# Patient Record
Sex: MALE | Race: WHITE | NOT HISPANIC OR LATINO | URBAN - METROPOLITAN AREA
[De-identification: names, ages, dates, MRNs, and addresses within clinical notes are randomized per-mention and may not be internally consistent; named-entity substitution may affect disease eponyms.]

---

## 2017-05-31 ENCOUNTER — OFFICE VISIT - HEALTHEAST (OUTPATIENT)
Dept: FAMILY MEDICINE | Facility: CLINIC | Age: 57
End: 2017-05-31

## 2017-05-31 DIAGNOSIS — G43.909 MIGRAINE HEADACHE: ICD-10-CM

## 2017-05-31 DIAGNOSIS — R79.81 LOW OXYGEN SATURATION: ICD-10-CM

## 2017-05-31 RX ORDER — MULTIPLE VITAMINS W/ MINERALS TAB 9MG-400MCG
1 TAB ORAL DAILY
Status: SHIPPED | COMMUNITY
Start: 2017-05-31

## 2017-05-31 RX ORDER — ATENOLOL 50 MG/1
50 TABLET ORAL DAILY
Status: SHIPPED | COMMUNITY
Start: 2017-05-31

## 2021-05-31 VITALS — WEIGHT: 252.7 LBS

## 2021-06-25 NOTE — PROGRESS NOTES
Progress Notes by Best Bettencourt DO at 5/31/2017  1:00 PM     Author: Best Bettencourt DO Service: -- Author Type: Physician    Filed: 6/1/2017  9:26 AM Encounter Date: 5/31/2017 Status: Signed    : Best Bettencourt DO (Physician)       Chief Complaint   Patient presents with   ? Eye Problem     Visual disturbance. Left eye had blind areas in it, 11:15am today. Light headed.    ? Headache     Just started.      History of Present Illness: Nursing notes reviewed. Patient has a dull headache behind her eyes. Patient had part of visual field disappear from vision out of left eye lasting about 10 minutes total, during which time he felt light headed. He felt dizzy during this visual disturbance, feeling better after sitting in his car for several minutes. His headache discomfort started while waiting in the clinic. No shortness of breath, heart palpitations, or chest pain. No prior history of lung disease. He had similar headache and visual changes once about 20 years ago, which he did not go be seen for. He is not surprised with his visual acuity being a little poor, with him stating he is overdue for an eye exam. Patient states he smoked about 1 PPD for about 15 years total, having quit about 20 years ago.    Review of systems: See history of present illness, otherwise negative.     Current Outpatient Prescriptions   Medication Sig Dispense Refill   ? ASCORBATE CALCIUM (VITAMIN C ORAL) Take by mouth.     ? aspirin 81 MG EC tablet Take 81 mg by mouth daily.     ? atenolol (TENORMIN) 50 MG tablet Take 50 mg by mouth daily.     ? multivitamin-minerals-lutein (MULTIVITAMIN 50 PLUS) tablet Take 1 tablet by mouth daily.       No current facility-administered medications for this visit.        No past medical history on file.   No past surgical history on file.   Social History     Social History   ? Marital status:      Spouse name: N/A   ? Number of children: N/A   ? Years of education: N/A     Social History  Main Topics   ? Smoking status: Former Smoker   ? Smokeless tobacco: Never Used   ? Alcohol use None   ? Drug use: None   ? Sexual activity: Not Asked     Other Topics Concern   ? None     Social History Narrative   ? None       History   Smoking Status   ? Former Smoker   Smokeless Tobacco   ? Never Used      Exam:   Blood pressure 126/76, pulse 66, temperature 98.2  F (36.8  C), temperature source Oral, resp. rate 12, weight (!) 252 lb 11.2 oz (114.6 kg), SpO2 95 %.    EXAM:   General: Vital signs reviewed. Patient is in no acute appearing distress with a pleasant, normal affect. Breathing is non labored appearing. Patient is alert and oriented x 3.   ENT: Tympanic membranes are clear and without injection bilaterally, nasal turbinates show no injection or rhinorrhea, no pharyngeal injection or exudate.  Eyes: PERRL with normal consensual gaze, eye movement, and convergence with accomodation. No scleral eye discoloration. Corneas are clear. Pupils are normal size. No abnormality noted on bilateral fundus exam.  Heart:  Heart rate is normal, regular rhythm without murmur.  Lungs:  Lung sounds are clear to auscultation with good airflow except for slight crackles noted in right lower lobe on initial exam, which cleared with deeper breaths.  Skin: warm and dry  Neuro: CN 2-12 intact. Patient has a normal symmetrical handgrip strength. No patient complaint of focal weakness, or change in sensation.  By time of discharge, he states he is feeling better overall.   Patient's initial SpO2. Given this, and his prior history of smoking tobacco, he was agreeable to my recommendation to do a chest x-ray. I reviewed this with him at exam, with study being normal. No abnormality was also noted by the radiologist per report which was reviewed with patient that exam. A repeat pulse oximetry test was done prior to departure, which was improved from the 93% earlier, to 95%.  Assessment/Plan   1. Low oxygen saturation  XR Chest PA  "and Lateral   2. Migraine headache         Patient Instructions     I think you are having a migraine headache. Also see info below. If any recurrent visual problem over the next 24 hours, or new symptoms of stroke, go to the ER.    Migraine Headache: Stages and Treatment    A migraine headache tends to progress in stages. Learning these stages can help you better understand what is happening. Then you can learn ways to reduce pain and relieve other symptoms. Methods for relieving your symptoms include self-care and medicines.  Migraine stages  Migraines tend to progress through 4 stages. Many people don't have all stages, and stages may differ with each headache:    Prodrome. A few hours to a day or so before the headache, you may feel tired, (yawning many times), uneasy, or elizabeth. You may also feel bloated or crave certain foods.    Aura. Up to an hour before the headache starts, some migraine sufferers experience aura--flashing lights, blind spots, other vision problems, confusion, difficulty speaking, or other neurologic symptoms.    Headache. Moderate to severe pain affects one side of the head and then can spread to both sides, often along with nausea. You may be highly sensitive to light, sound, and odors. Vomiting or diarrhea may also happen. This stage lasts 4 to 72 hours.    Postdrome. After your headache ends, you may feel tired, achy, and \"washed out.\" This may last for a day or so.  Self-care during a migraine  Here is what you can do:    Use a cold compress. Wrap a thin cloth around a cold pack, a cold can of soda, or a bag of frozen vegetables. Apply this to your temple or other pain site.    Drink fluids. If nausea makes it hard to drink, try sucking on ice.    Rest. If possible, lie down. Try not to bend over, as this may increase your pain. Sometimes laying in a dark quiet room can help the migraine from being aggravated.      Try caffeine. Some people find that drinking fluids with caffeine, such " as coffee or tea, helps to lessen migraine pain.  Using medicines  Work with your healthcare provider to find the right medicines for you. Medicines for migraine may relieve pain (analgesics), relieve nausea, or attack the migraine's root causes (migraine-specific medicines).  Rebound headache  Taking analgesics each day, or even several times a week, may lead to more frequent and severe headaches. These are called rebound headaches. If you think you're having rebound headaches, tell your healthcare provider. He or she can help you safely decrease your medicine. Rebound caffeine withdrawal headaches can also happen.    Date Last Reviewed: 10/9/2015    5241-6370 [employer]. [address] [place] [address], [place] [address]. All rights reserved. This information is not intended as a substitute for professional medical care. Always follow your healthcare professional's instructions.        Symptoms of a Stroke     A sudden feeling of weakness on one side of your body may be a sign that you are having a stroke.   During a stroke, blood stops flowing to part of the brain. This can damage areas in the brain that control the rest of the body. Get help right away if any of these symptoms come on suddenly, even if the symptoms dont last.  Know the symptoms of a stroke    Weakness. You may feel a sudden weakness, tingling, or a loss of feeling on 1 side of your face or body including your arm or leg.     Vision problems. You may have sudden double vision or trouble seeing in 1 or both eyes.    Speech problems. You may have sudden trouble talking, slurred speech, or problems understanding others.    Headache. You may have a sudden, severe headache.    Movement problems. You may have sudden trouble walking, dizziness, a feeling of spinning, a loss of balance, a feeling of falling, or blackouts.    Seizure. You may also have a seizure with a large or hemorrhagic stroke.   Remember: If you have any of these symptoms, call  911 and your doctor as soon as possible.  F.A.S.T. is an easy way to remember the signs of a stroke. When you see these signs, you will know that you need to call 911 fast.   F.A.S.T. stands for:    F is for face drooping - One side of the face is drooping or numb. When the person smiles, the smile is uneven.    A is for arm weakness - One arm is weak or numb. When the person lifts both arms at the same time, one arm may drift downward.    S is for speech difficulty - You may notice slurred speech or difficulty speaking. The person can't repeat a simple sentence correctly when asked.    T is for time to dial 911 - If someone shows any of these symptoms, even if they go away, call 911 immediately. Make note of the time the symptoms first appeared.   Date Last Reviewed: 8/26/2015 2000-2017 [employer]. [address] [place] [address], [place] [address]. All rights reserved. This information is not intended as a substitute for professional medical care. Always follow your healthcare professional's instructions.           Best Bettencourt,